# Patient Record
Sex: FEMALE | Race: WHITE | NOT HISPANIC OR LATINO | Employment: OTHER | URBAN - METROPOLITAN AREA
[De-identification: names, ages, dates, MRNs, and addresses within clinical notes are randomized per-mention and may not be internally consistent; named-entity substitution may affect disease eponyms.]

---

## 2021-07-16 NOTE — PROGRESS NOTES
Subjective   Patient ID: Estelita Cloud is a 80 y.o. female is being seen for consultation today at the request of Rosibel Franco MD  Patient is being seen for neck pain    10/2/20 MRI [facility]    Patient reports today she has neck and low back pain.     This very nice lady is seen here for chronic neck pain and low back pain.  She has been seen by Dr. Pérez in the past when she had an episode of left sacroiliac pain and underwent a sacroiliac injection which helped quite a bit.  About 8 or 9 months ago, she began having pure mechanical neck pain.  No radiating symptoms into the arms, no weakness or numbness or tingling to the arms.  This has been going on for about 6 months before gradually started to get better about 2 or 3 months ago and right now she is back to her baseline.  Her range of motion is much better.  She is also in the past had chronic low back pain without radiating leg pain.  We reviewed her radiographic studies in the neck which shortly showed some facet disease which could account for her symptoms of neck pain.  She had an x-ray done in the [facility] of the lumbar spine which also showed a lot of the same findings so I presume her symptoms are related to her degenerative disease.  She is back to her baseline in terms of the neck.  I gave her some simple exercises to do including the chin tuck and told her to do those on a daily basis.  If symptoms worsen she will let me know and perhaps she can be seen for pain injections perhaps an epidural block or even a radiofrequency ablation of the neck.  Her low back is still hurting and I told her to go back to see Dr. Beto reynaga for more injections.  Personally I think she might be a candidate for radiofrequency ablation as well in the low back.  But given that the neck is much better we will keep it open-ended.  I reassured her that there was nothing surgical needs to be done.    Neck Pain   This is a recurrent problem. The  "current episode started more than 1 year ago. The problem occurs intermittently. The problem has been gradually improving. The pain is associated with nothing. The pain is present in the midline. The quality of the pain is described as aching. The symptoms are aggravated by position. Pertinent negatives include no chest pain, headaches, numbness or tingling.   Back Pain  This is a chronic problem. The current episode started more than 1 year ago. The problem occurs daily. The problem has been gradually worsening since onset. The pain is present in the lumbar spine. The quality of the pain is described as aching. The symptoms are aggravated by sitting. Associated symptoms include bladder incontinence (Several years). Pertinent negatives include no bowel incontinence, chest pain, headaches, numbness or tingling.       The following portions of the patient's history were reviewed and updated as appropriate: allergies, current medications, past family history, past medical history, past social history, past surgical history and problem list.    Review of Systems   Constitutional: Positive for activity change.   HENT: Negative.    Eyes: Negative.    Respiratory: Negative for chest tightness and shortness of breath.    Cardiovascular: Negative for chest pain.   Gastrointestinal: Negative.  Negative for bowel incontinence.   Endocrine: Negative.    Genitourinary: Positive for bladder incontinence (Several years).   Musculoskeletal: Positive for back pain, myalgias and neck pain.   Skin: Negative.    Allergic/Immunologic: Negative.    Neurological: Negative for tingling, numbness and headaches.   Hematological: Negative.    Psychiatric/Behavioral: Negative.    All other systems reviewed and are negative.          Objective     Vitals:    07/19/21 1117   BP: 132/76   Pulse: 64   Temp: 96.8 °F (36 °C)   SpO2: 95%   Weight: 54 kg (119 lb)   Height: 149.9 cm (59\")     Body mass index is 24.04 kg/m².      Physical " Exam  Constitutional:       Appearance: She is well-developed.   HENT:      Head: Normocephalic and atraumatic.   Eyes:      Extraocular Movements: EOM normal.      Conjunctiva/sclera: Conjunctivae normal.      Pupils: Pupils are equal, round, and reactive to light.   Neck:      Vascular: No carotid bruit.   Neurological:      Mental Status: She is oriented to person, place, and time.      Coordination: Finger-Nose-Finger Test and Heel to Shin Test normal.      Gait: Gait is intact.      Deep Tendon Reflexes:      Reflex Scores:       Tricep reflexes are 2+ on the right side and 2+ on the left side.       Bicep reflexes are 2+ on the right side and 2+ on the left side.       Brachioradialis reflexes are 2+ on the right side and 2+ on the left side.       Patellar reflexes are 2+ on the right side and 2+ on the left side.       Achilles reflexes are 2+ on the right side and 2+ on the left side.  Psychiatric:         Speech: Speech normal.       Neurologic Exam     Mental Status   Oriented to person, place, and time.   Registration of memory: Good recent and remote memory.   Attention: normal. Concentration: normal.   Speech: speech is normal   Level of consciousness: alert  Knowledge: consistent with education.     Cranial Nerves     CN II   Visual fields full to confrontation.   Visual acuity: normal    CN III, IV, VI   Pupils are equal, round, and reactive to light.  Extraocular motions are normal.     CN V   Facial sensation intact.   Right corneal reflex: normal  Left corneal reflex: normal    CN VII   Facial expression full, symmetric.   Right facial weakness: none  Left facial weakness: none    CN VIII   Hearing: intact    CN IX, X   Palate: symmetric    CN XI   Right sternocleidomastoid strength: normal  Left sternocleidomastoid strength: normal    CN XII   Tongue: not atrophic  Tongue deviation: none    Motor Exam   Muscle bulk: normal  Right arm tone: normal  Left arm tone: normal  Right leg tone:  normal  Left leg tone: normal    Strength   Strength 5/5 except as noted.     Sensory Exam   Light touch normal.     Gait, Coordination, and Reflexes     Gait  Gait: normal    Coordination   Finger to nose coordination: normal  Heel to shin coordination: normal    Reflexes   Right brachioradialis: 2+  Left brachioradialis: 2+  Right biceps: 2+  Left biceps: 2+  Right triceps: 2+  Left triceps: 2+  Right patellar: 2+  Left patellar: 2+  Right achilles: 2+  Left achilles: 2+  Right : 2+  Left : 2+          Assessment/Plan   Independent Review of Radiographic Studies:      I personally reviewed the images from the following studies.    I reviewed the cervical MRI done on 10/2/2020 which showed multilevel disc desiccation degeneration and some facet disease but no significant spinal stenosis or no significant cord compression or root compression.  Agree with the report.    Medical Decision Making:      She was reassured at the end of the visit.  I told her to do her neck exercises specifically her range of motion exercises and her chin tuck.  If her symptoms worsen in the neck she can call me and let me know or she can call Dr. Pérez rectally and perhaps get a radiofrequency ablation of the neck.  If her arms start to hurt her, she will call us and let me know when she should be reevaluated.  If the low back continues to hurt her as it does, I told her to get in touch with Dr. Pérez and perhaps he can try an ablation approach into the low back.    Otherwise since the neck is much better, we will keep it open-ended.      Diagnoses and all orders for this visit:    1. Chronic neck pain (Primary)    2. Cervical spinal stenosis    3. DDD (degenerative disc disease), lumbar      Return if symptoms worsen or fail to improve.

## 2021-07-19 ENCOUNTER — OFFICE VISIT (OUTPATIENT)
Dept: NEUROSURGERY | Facility: CLINIC | Age: 81
End: 2021-07-19

## 2021-07-19 VITALS
TEMPERATURE: 96.8 F | BODY MASS INDEX: 23.99 KG/M2 | SYSTOLIC BLOOD PRESSURE: 132 MMHG | DIASTOLIC BLOOD PRESSURE: 76 MMHG | WEIGHT: 119 LBS | OXYGEN SATURATION: 95 % | HEIGHT: 59 IN | HEART RATE: 64 BPM

## 2021-07-19 DIAGNOSIS — M54.2 CHRONIC NECK PAIN: Primary | ICD-10-CM

## 2021-07-19 DIAGNOSIS — M48.02 CERVICAL SPINAL STENOSIS: ICD-10-CM

## 2021-07-19 DIAGNOSIS — M51.36 DDD (DEGENERATIVE DISC DISEASE), LUMBAR: ICD-10-CM

## 2021-07-19 DIAGNOSIS — G89.29 CHRONIC NECK PAIN: Primary | ICD-10-CM

## 2021-07-19 PROCEDURE — 99204 OFFICE O/P NEW MOD 45 MIN: CPT | Performed by: NEUROLOGICAL SURGERY

## 2022-10-25 ENCOUNTER — APPOINTMENT (OUTPATIENT)
Dept: WOMENS IMAGING | Facility: HOSPITAL | Age: 82
End: 2022-10-25